# Patient Record
Sex: FEMALE | Race: BLACK OR AFRICAN AMERICAN | NOT HISPANIC OR LATINO | Employment: OTHER | URBAN - METROPOLITAN AREA
[De-identification: names, ages, dates, MRNs, and addresses within clinical notes are randomized per-mention and may not be internally consistent; named-entity substitution may affect disease eponyms.]

---

## 2017-01-25 ENCOUNTER — HOSPITAL ENCOUNTER (EMERGENCY)
Facility: OTHER | Age: 62
Discharge: HOME | End: 2017-01-25
Attending: EMERGENCY MEDICINE
Payer: MEDICARE

## 2017-01-25 VITALS
OXYGEN SATURATION: 100 % | HEART RATE: 79 BPM | WEIGHT: 223.56 LBS | SYSTOLIC BLOOD PRESSURE: 153 MMHG | HEIGHT: 64 IN | TEMPERATURE: 98 F | BODY MASS INDEX: 38.17 KG/M2 | DIASTOLIC BLOOD PRESSURE: 78 MMHG | RESPIRATION RATE: 18 BRPM

## 2017-01-25 DIAGNOSIS — M25.562 ACUTE PAIN OF LEFT KNEE: Primary | ICD-10-CM

## 2017-01-25 DIAGNOSIS — M25.569 KNEE PAIN: ICD-10-CM

## 2017-01-25 PROCEDURE — 96372 THER/PROPH/DIAG INJ SC/IM: CPT

## 2017-01-25 PROCEDURE — 63600175 PHARM REV CODE 636 W HCPCS: Performed by: PHYSICIAN ASSISTANT

## 2017-01-25 PROCEDURE — 99283 EMERGENCY DEPT VISIT LOW MDM: CPT | Mod: 25

## 2017-01-25 RX ORDER — KETOROLAC TROMETHAMINE 30 MG/ML
30 INJECTION, SOLUTION INTRAMUSCULAR; INTRAVENOUS
Status: COMPLETED | OUTPATIENT
Start: 2017-01-25 | End: 2017-01-25

## 2017-01-25 RX ORDER — DICLOFENAC SODIUM 25 MG/1
25 TABLET, DELAYED RELEASE ORAL 3 TIMES DAILY PRN
Qty: 30 TABLET | Refills: 0 | Status: SHIPPED | OUTPATIENT
Start: 2017-01-25

## 2017-01-25 RX ADMIN — KETOROLAC TROMETHAMINE 30 MG: 30 INJECTION, SOLUTION INTRAMUSCULAR at 09:01

## 2017-01-25 NOTE — ED AVS SNAPSHOT
[facility]  [address]  [facility] [address]               Davida Philip   2017  8:27 PM   ED    Description:  Female : 1955   Department:  [facility]           Your Care was Coordinated By:     Provider Role From To    Renny Pena II, MD Attending Provider 17 --    Shyanne Lopez PA-C Physician Assistant 17 --      Reason for Visit     Sciatica           Diagnoses this Visit        Comments    Acute pain of left knee    -  Primary     Knee pain           ED Disposition     None           To Do List           Follow-up Information     Follow up with [facility] In 2 days.    Contact information:    [address] [facility] [address] [phone]         These Medications        Disp Refills Start End    diclofenac (VOLTAREN) 25 MG TbEC 30 tablet 0 2017     Take 1 tablet (25 mg total) by mouth 3 (three) times daily as needed (pain). - Oral      [facility]     [facility] -  Assistance  Registered nurses in the [facility] provide clinical advisement, health education, appointment booking, and other advisory services.  Call for this free service at [phone].             Medications           START taking these NEW medications        Refills    diclofenac (VOLTAREN) 25 MG TbEC 0    Sig: Take 1 tablet (25 mg total) by mouth 3 (three) times daily as needed (pain).    Class: Print    Route: Oral      These medications were administered today        Dose Freq    ketorolac injection 30 mg 30 mg ED 1 Time    Sig: Inject 30 mg into the muscle ED 1 Time.    Class: Normal    Route: Intramuscular      STOP taking these medications     ibuprofen (ADVIL,MOTRIN) 600 MG tablet Take 1 tablet (600 mg total) by mouth every 6 (six) hours as needed for Pain.    ibuprofen (ADVIL,MOTRIN) 600 MG tablet Take 1 tablet (600 mg total) by mouth every 6  "(six) hours as needed for Pain.           Verify that the below list of medications is an accurate representation of the medications you are currently taking.  If none reported, the list may be blank. If incorrect, please contact your healthcare provider. Carry this list with you in case of emergency.           Current Medications     hydrochlorothiazide (HYDRODIURIL) 12.5 MG Tab Take 12.5 mg by mouth once daily.    lisinopril (PRINIVIL,ZESTRIL) 5 MG tablet Take 5 mg by mouth once daily.    diclofenac (VOLTAREN) 25 MG TbEC Take 1 tablet (25 mg total) by mouth 3 (three) times daily as needed (pain).           Clinical Reference Information           Your Vitals Were     BP Pulse Temp Resp Height Weight    175/80 (BP Location: Left arm, Patient Position: Sitting) 79 98.1 °F (36.7 °C) (Oral) 20 5' 4" (1.626 m) 101.4 kg (223 lb 8.7 oz)    SpO2 BMI             98% 38.37 kg/m2         Allergies as of 1/25/2017     No Known Allergies      Immunizations Administered on Date of Encounter - 1/25/2017     None      ED Micro, Lab, POCT     None      ED Imaging Orders     Start Ordered       Status Ordering Provider    01/25/17 2058 01/25/17 2058  X-Ray Knee 3 View Left  1 time imaging      Final result       Discharge References/Attachments     KNEE PAIN (ENGLISH)    KNEE PAIN AND SWELLING, REDUCING (ENGLISH)      [facility] Sign-Up     Activating your [facility] account is as easy as 1-2-3!     1) Visit [email or web address], select Sign Up Now, enter this activation code and your date of birth, then select Next.  [email or web address]  Expires: 3/11/2017  9:51 PM      2) Create a username and password to use when you visit [facility] in the future and select a security question in case you lose your password and select Next.    3) Enter your e-mail address and click Sign Up!    Additional Information  If you have questions, please e-mail [email or web address] or call [phone] to talk to our [facility] staff. Remember, [facility] is NOT " to be used for urgent needs. For medical emergencies, dial 911.         Smoking Cessation     If you would like to quit smoking:   You may be eligible for free services if you are a [place] resident and started smoking cigarettes before September 1, 1988.  Call the [employer]oking [employer]essation [employer] (SCT) toll free at (505) 923-4037 or (948) 221-7947.   Call 1-800-QUIT-NOW if you do not meet the above criteria.             [facility] complies with applicable Federal civil rights laws and does not discriminate on the basis of race, color, national origin, age, disability, or sex.        Language Assistance Services     ATTENTION: Language assistance services are available, free of charge. Please call [phone].      ATENCIÓN: Si habla kolton, tiene a altman disposición servicios gratuitos de asistencia lingüística. Llame al [phone].     CHÚ Ý: N?u b?n nói Ti?ng Vi?t, có các d?ch v? h? tr? ngôn ng? mi?n phí dành cho b?n. G?i s? [phone].

## 2017-01-26 NOTE — ED NOTES
Pt has been having left leg and knee pain since Monday. Denies trauma. Woke up and it was hurting. No signs of trauma noted.  LOC: Pt is awake alert and aware of environment, oriented X3 and speaking appropriately  Appearance: Pt is in no acute distress, Pt is well groomed and clean  Skin: skin is warm and dry with normal turgor, mucus membranes are moist and pink, skin is intact with no bruising or breakdown  Muskuloskeletal: Pt moves all extremities well, there is no obvious swelling or deformities noted, pulses are intact.  Respiratory: Airway is open and patent, respirations are spontaneous and even.  Cardiac: slight lower leg edema noted and cap refill is <3sec  Neuro: Pt follows commands easily and has no obvious deficits

## 2017-01-26 NOTE — ED PROVIDER NOTES
Encounter Date: 1/25/2017       History     Chief Complaint   Patient presents with    Sciatica     since Monday, w/ shooting pain down the L leg     Review of patient's allergies indicates:  No Known Allergies  HPI Comments: 61-year-old female with hypertension presents to the emergency department with complaints of left knee pain.  She states the pain is an present since Sunday.  She states that it shoots up her leg.  She denies any numbness, weakness, trauma, injury, swelling, chest pain, shortness breath.  She states she's been treating at home with ibuprofen and Aleve with no improvement.  She states that she thought maybe she pulled a muscle is not improving.  She is obese.    The history is provided by the patient.     Past Medical History   Diagnosis Date    Hepatitis C     Hypertension      No past medical history pertinent negatives.  Past Surgical History   Procedure Laterality Date    Hysterectomy       History reviewed. No pertinent family history.  Social History   Substance Use Topics    Smoking status: Former Smoker    Smokeless tobacco: None    Alcohol use Yes      Comment: socially     Review of Systems   Constitutional: Negative for chills and fever.   HENT: Negative for sore throat.    Respiratory: Negative for shortness of breath.    Cardiovascular: Negative for chest pain.   Gastrointestinal: Negative for nausea and vomiting.   Genitourinary: Negative for dysuria.   Musculoskeletal: Positive for arthralgias (left knee) and gait problem. Negative for back pain, myalgias, neck pain and neck stiffness.   Skin: Negative for rash.   Neurological: Negative for weakness and numbness.   Hematological: Does not bruise/bleed easily.       Physical Exam   Initial Vitals   BP Pulse Resp Temp SpO2   01/25/17 1931 01/25/17 1931 01/25/17 1931 01/25/17 1931 01/25/17 1931   175/80 79 20 98.1 °F (36.7 °C) 98 %     Physical Exam    Nursing note and vitals reviewed.  Constitutional: Vital signs are normal.  She appears well-developed and well-nourished.  Non-toxic appearance. No distress.   HENT:   Head: Normocephalic and atraumatic.   Right Ear: External ear normal.   Left Ear: External ear normal.   Nose: Nose normal.   Mouth/Throat: Oropharynx is clear and moist.   Eyes: Conjunctivae, EOM and lids are normal. Pupils are equal, round, and reactive to light. No scleral icterus.   Neck: Normal range of motion and phonation normal. Neck supple.   Cardiovascular: Normal rate, regular rhythm, normal heart sounds, intact distal pulses and normal pulses. Exam reveals no gallop, no friction rub and no decreased pulses.    No murmur heard.  Pulses:       Dorsalis pedis pulses are 2+ on the right side, and 2+ on the left side.   Pulmonary/Chest: Breath sounds normal. No respiratory distress. She has no wheezes. She has no rhonchi. She has no rales.   Abdominal: Soft. Normal appearance and bowel sounds are normal. There is no tenderness. There is no rebound and no guarding.   Musculoskeletal: Normal range of motion.        Left knee: She exhibits normal range of motion, no swelling, no effusion, no ecchymosis, no deformity, no laceration, no erythema, normal alignment, no LCL laxity, normal patellar mobility, no bony tenderness, normal meniscus and no MCL laxity. No tenderness found.   No obvious deformities, moving all extremities, normal gait  No midline just palpation to the cervical, thoracic or lumbar spine.  No step-offs.  Intact distal pulses and no sensory deficits.  Full range of motion shunt 5 out of 5 of the left lower extremity.  No bony deformity or bony tenderness palpation.  No edema.  Negative Homans sign.  No palpable cords.  Pain with range of motion of the left knee with no obvious bony deformity or crepitus.  No decreased range of motion.  No erythema, warmth or ecchymosis   Neurological: She is alert and oriented to person, place, and time. She has normal strength and normal reflexes. She displays no  atrophy. No sensory deficit. She exhibits normal muscle tone. Coordination and gait normal. GCS eye subscore is 4. GCS verbal subscore is 5. GCS motor subscore is 6.   Skin: Skin is warm, dry and intact. No abrasion, no bruising, no ecchymosis, no laceration, no lesion and no rash noted. No erythema.   Psychiatric: She has a normal mood and affect. Her speech is normal and behavior is normal. Judgment normal. Cognition and memory are normal.         ED Course   Procedures  Labs Reviewed - No data to display     Imaging Results         X-Ray Knee 3 View Left (Final result) Result time:  01/25/17 21:31:36    Final result by Shanta Estrella MD (01/25/17 21:31:36)    Impression:        No acute displaced fracture or dislocation.      Electronically signed by: SHANTA ESTRELLA MD, MD  Date:     01/25/17  Time:    21:31     Narrative:    COMPARISON: None    FINDINGS: 3 views of the left knee.      The alignment is within normal limits.   No acute displaced fracture, dislocation, or destructive osseous process identified.  Mild tricompartmental degenerative change.  Chondrocalcinosis of the medial and lateral compartments. No large suprapatellar joint effusion. Enthesophyte at the quadriceps insertion site. No subcutaneous emphysema or radiodense retained foreign body.              X-Rays:   Independently Interpreted Readings:   Other Readings:  Left knee- no acute fracture or dislocation    Medical Decision Making:   History:   Old Medical Records: I decided to obtain old medical records.  Initial Assessment:   61-year-old female with complaints consistent with left knee pain.  Afebrile neurovascular intact.  Elevated blood pressure with known history of hypertension.  She states she is compliant with her medications.  She denies chest pain or shortness of breath.  She is alert, healthy and nontoxic appearing.  She is in no apparent distress.  Patient reports pain to the left knee.  There is no evidence of trauma or injury.   No evidence of septic joint or serious bacterial infection.  I do not suspect DVT based on history and exam.  Independently Interpreted Test(s):   I have ordered and independently interpreted X-rays - see prior notes.  Clinical Tests:   Radiological Study: Ordered and Reviewed  ED Management:  X-ray obtained and independently interpreted by myself no evidence of fracture dislocation.  She was administered Toradol and an Ace wrap was applied.  She is stable and will be discharged home with a prescription for diclofenac and care instructions.  She is to follow-up with her doctor in the next 48 hours or return for any worsening signs or symptoms.  She states understanding.  This patient was discussed with the attending physician who agrees with treatment plan.  Other:   I have discussed this case with another health care provider.       <> Summary of the Discussion: Becky  This note was created using Dragon Medical dictation.  There may be typographical errors secondary to dictation.                     ED Course     Clinical Impression:     1. Acute pain of left knee    2. Knee pain          Disposition:   Disposition: Discharged  Condition: Stable       Shyanne Lopez PA-C  01/25/17 8532

## 2017-01-26 NOTE — ED NOTES
Leader rounding was completed on this patient: expectations of the visit were discussed, the plan of care was addressed, and there was validation that the assigned nurse met her needs. All questions\concerns that she had were addressed and the nurse was notified.